# Patient Record
Sex: MALE | Race: ASIAN | NOT HISPANIC OR LATINO | URBAN - METROPOLITAN AREA
[De-identification: names, ages, dates, MRNs, and addresses within clinical notes are randomized per-mention and may not be internally consistent; named-entity substitution may affect disease eponyms.]

---

## 2018-08-24 ENCOUNTER — OFFICE VISIT - HEALTHEAST (OUTPATIENT)
Dept: INTERNAL MEDICINE | Facility: CLINIC | Age: 28
End: 2018-08-24

## 2018-08-24 DIAGNOSIS — F41.1 GENERALIZED ANXIETY DISORDER: ICD-10-CM

## 2018-08-24 ASSESSMENT — MIFFLIN-ST. JEOR: SCORE: 1519.21

## 2019-05-13 ENCOUNTER — OFFICE VISIT - HEALTHEAST (OUTPATIENT)
Dept: INTERNAL MEDICINE | Facility: CLINIC | Age: 29
End: 2019-05-13

## 2019-05-13 DIAGNOSIS — F41.1 GENERALIZED ANXIETY DISORDER: ICD-10-CM

## 2019-05-13 DIAGNOSIS — G47.9 SLEEP DISORDER: ICD-10-CM

## 2019-05-13 ASSESSMENT — MIFFLIN-ST. JEOR: SCORE: 1553.23

## 2020-02-27 ENCOUNTER — OFFICE VISIT - HEALTHEAST (OUTPATIENT)
Dept: INTERNAL MEDICINE | Facility: CLINIC | Age: 30
End: 2020-02-27

## 2020-02-27 DIAGNOSIS — F41.1 GENERALIZED ANXIETY DISORDER: ICD-10-CM

## 2020-02-27 RX ORDER — GABAPENTIN 100 MG/1
100 CAPSULE ORAL 2 TIMES DAILY PRN
Qty: 180 CAPSULE | Refills: 3 | Status: SHIPPED | OUTPATIENT
Start: 2020-02-27 | End: 2023-09-18

## 2020-11-01 ENCOUNTER — COMMUNICATION - HEALTHEAST (OUTPATIENT)
Dept: INTERNAL MEDICINE | Facility: CLINIC | Age: 30
End: 2020-11-01

## 2020-11-01 DIAGNOSIS — F41.1 GENERALIZED ANXIETY DISORDER: ICD-10-CM

## 2021-04-19 ENCOUNTER — COMMUNICATION - HEALTHEAST (OUTPATIENT)
Dept: INTERNAL MEDICINE | Facility: CLINIC | Age: 31
End: 2021-04-19

## 2021-04-19 DIAGNOSIS — F41.1 GENERALIZED ANXIETY DISORDER: ICD-10-CM

## 2021-04-19 RX ORDER — ESCITALOPRAM OXALATE 20 MG/1
TABLET ORAL
Qty: 90 TABLET | Refills: 0 | Status: SHIPPED | OUTPATIENT
Start: 2021-04-19 | End: 2021-10-13

## 2021-05-28 NOTE — PROGRESS NOTES
ASSESSMENT AND PLAN:    1. Generalized anxiety disorder  Improved.  Possible side effects. He will reduce dose and follow up prn and in 4 months    - escitalopram oxalate (LEXAPRO) 20 MG tablet; Take 1 tablet (20 mg total) by mouth daily.  Dispense: 30 tablet; Refill: 6    2. Sleepiness  Suspect related to quiet, boring work, yet can't exclude effect of the medication. ANDREA is also possibility, yet not strongly suggested.  I offered sleep study, he prefers to try lower dose and follow.  We did discuss avoiding high carbohydrate breakfast.     Patient Instructions   1. We discussed sleepiness and possible sleep disorder.  Will consider evaluation if symptoms persist or progress.      2. Decrease escitalopram to 10 mg po daily and see how symptoms  Go.    3. Follow up in 4 months    CHIEF COMPLAINT:  Chief Complaint   Patient presents with     Medication Management     HISTORY OF PRESENT ILLNESS:  Fercho Toure is a 29 y.o. male here for follow up and evaluation of sleepiness.  He will sometimes 'nod off' at work either before or after lunch, usually.  He is employed now, and works in quiet environment, at a banking entity.  He sleeps pretty well, and uses caffeine.  No excess alcohol.  Anxiety and panic are improved, but he wonders if the escitalopram is causing the sleepiness.  He does exercise and tolerates this well.  No fever, or cough, or sore throat, or diarrhea. No headache.     REVIEW OF SYSTEMS:   See HPI, all other systems on review are negative.    Past Medical History:   Diagnosis Date     Anxiety and depression      Social History     Tobacco Use   Smoking Status Never Smoker   Smokeless Tobacco Never Used     Family History   Problem Relation Age of Onset     Anxiety disorder Mother      Past Surgical History:   Procedure Laterality Date     NO PAST SURGERIES       VITALS:  Vitals:    05/13/19 1136   BP: 102/80   Patient Site: Left Arm   Patient Position: Sitting   Cuff Size: Adult Regular   Pulse:  "68   SpO2: 98%   Weight: 152 lb 12.8 oz (69.3 kg)   Height: 5' 3\" (1.6 m)     Wt Readings from Last 3 Encounters:   05/13/19 152 lb 12.8 oz (69.3 kg)   08/24/18 145 lb 4.8 oz (65.9 kg)     PHYSICAL EXAM:  Constitutional:  In NAD, alert and oriented  Cardiac:  Regular rhythm  Neurologic:  Speech clear, motor intact, gait normal     Psychiatric:  Mood appropriate, memory good, thinking is clear.     Current Outpatient Medications   Medication Sig Dispense Refill     escitalopram oxalate (LEXAPRO) 20 MG tablet Take 1 tablet (20 mg total) by mouth daily. 30 tablet 6     gabapentin (NEURONTIN) 100 MG capsule Take 100 mg by mouth 2 (two) times a day as needed. 60 capsule 3     No current facility-administered medications for this visit.      Harlan Ibrahim MD  Internal Medicine  [facility]  "

## 2021-06-01 VITALS — HEIGHT: 63 IN | BODY MASS INDEX: 25.75 KG/M2 | WEIGHT: 145.3 LBS

## 2021-06-03 VITALS — BODY MASS INDEX: 27.07 KG/M2 | WEIGHT: 152.8 LBS | HEIGHT: 63 IN

## 2021-06-04 VITALS
HEART RATE: 72 BPM | BODY MASS INDEX: 25.95 KG/M2 | SYSTOLIC BLOOD PRESSURE: 118 MMHG | DIASTOLIC BLOOD PRESSURE: 70 MMHG | WEIGHT: 146.5 LBS

## 2021-06-06 NOTE — PROGRESS NOTES
ASSESSMENT AND PLAN:    1. Generalized anxiety disorder  Ongoing symptoms when he stops the medications.  Wants to restart.  History of SHERIE with OCD features and some social phobia.  Clinically no depression at this time, or self harm ideation  Actually doing pretty well with work, although significant stress. We discussed the benefits of exercise.    - escitalopram oxalate (LEXAPRO) 20 MG tablet; Take 1 tablet (20 mg total) by mouth daily.  Dispense: 90 tablet; Refill: 3  - gabapentin (NEURONTIN) 100 MG capsule; Take 100 mg by mouth 2 (two) times a day as needed.  Dispense: 180 capsule; Refill: 3    Patient Instructions   1. restart escitalopram 20 mg by mouth daily.     2. Can have referral to psychology or psychiatry if you feel it is needed.     CHIEF COMPLAINT:  Chief Complaint   Patient presents with     Medication Management     Follow-up     HISTORY OF PRESENT ILLNESS:  Fercho Toure is a 29 y.o. male here in follow up.  Has been on escitalopram for SHERIE and he recently stopped the medication.  His anxiety 'came back' and so he would like to resume.  Long history of SHERIE in late youth, with OCD and social phobia.  At one time he had depression without significant thoughts of self harm. Gainfully employed,but it is stressful.  Otherwise doing well, does not exercise much.     REVIEW OF SYSTEMS:   See HPI, all other systems on review are negative.    Past Medical History:   Diagnosis Date     Anxiety and depression      Social History     Tobacco Use   Smoking Status Never Smoker   Smokeless Tobacco Never Used     Family History   Problem Relation Age of Onset     Anxiety disorder Mother      Suicidality Paternal Grandfather         possible     Past Surgical History:   Procedure Laterality Date     NO PAST SURGERIES       VITALS:  Vitals:    02/27/20 1254   BP: 118/70   Patient Site: Left Arm   Patient Position: Sitting   Cuff Size: Adult Regular   Pulse: 72   Weight: 146 lb 8 oz (66.5 kg)     Wt Readings  from Last 3 Encounters:   02/27/20 146 lb 8 oz (66.5 kg)   05/13/19 152 lb 12.8 oz (69.3 kg)   08/24/18 145 lb 4.8 oz (65.9 kg)     PHYSICAL EXAM:  Constitutional:  In NAD, alert and oriented  Neurologic:  Speech clear, no arm or leg weakness, gait normal     Psychiatric:  Mood and behavior appropriate, thinking is clear.     Current Outpatient Medications   Medication Sig Dispense Refill     escitalopram oxalate (LEXAPRO) 20 MG tablet Take 1 tablet (20 mg total) by mouth daily. 90 tablet 3     gabapentin (NEURONTIN) 100 MG capsule Take 100 mg by mouth 2 (two) times a day as needed. 180 capsule 3     Harlan Ibrahim MD  Internal Medicine  [facility]

## 2021-06-06 NOTE — PATIENT INSTRUCTIONS - HE
1. restart escitalopram 20 mg by mouth daily.     2. Can have referral to psychology or psychiatry if you feel it is needed.

## 2021-06-12 NOTE — TELEPHONE ENCOUNTER
Refill Approved    Rx renewed per Medication Renewal Policy. Medication was last renewed on 2/27/20.    Juana Salguero, [facility] Connection Triage/Med Refill 11/4/2020     Requested Prescriptions   Pending Prescriptions Disp Refills     escitalopram oxalate (LEXAPRO) 20 MG tablet [Pharmacy Med Name: ESCITALOPRAM 20 MG TABLET] 90 tablet 2     Sig: TAKE 1 TABLET BY MOUTH EVERY DAY       SSRI Refill Protocol  Passed - 11/1/2020 10:14 AM        Passed - PCP or prescribing provider visit in last year     Last office visit with prescriber/PCP: 2/27/2020 Harlan Ibrahim MD OR same dept: 2/27/2020 Harlan Ibrahim MD OR same specialty: 2/27/2020 Harlan Ibrahim MD  Last physical: Visit date not found Last MTM visit: Visit date not found   Next visit within 3 mo: Visit date not found  Next physical within 3 mo: Visit date not found  Prescriber OR PCP: Harlan Ibrahim MD  Last diagnosis associated with med order: 1. Generalized anxiety disorder  - escitalopram oxalate (LEXAPRO) 20 MG tablet [Pharmacy Med Name: ESCITALOPRAM 20 MG TABLET]; TAKE 1 TABLET BY MOUTH EVERY DAY  Dispense: 90 tablet; Refill: 2    If protocol passes may refill for 12 months if within 3 months of last provider visit (or a total of 15 months).

## 2021-06-16 PROBLEM — F41.1 GENERALIZED ANXIETY DISORDER: Status: ACTIVE | Noted: 2018-08-24

## 2021-06-16 NOTE — TELEPHONE ENCOUNTER
RN cannot approve Refill Request    RN can NOT refill this medication Protocol failed and NO refill given. Last office visit: 2/27/2020 Harlan Ibrahim MD Last Physical: Visit date not found Last MTM visit: Visit date not found Last visit same specialty: 2/27/2020 Harlan Ibrahim MD.  Next visit within 3 mo: Visit date not found  Next physical within 3 mo: Visit date not found      Agustin Muñoz, [facility] Connection Triage/Med Refill 4/19/2021    Requested Prescriptions   Pending Prescriptions Disp Refills     escitalopram oxalate (LEXAPRO) 20 MG tablet [Pharmacy Med Name: ESCITALOPRAM 20 MG TABLET] 90 tablet 0     Sig: TAKE 1 TABLET BY MOUTH EVERY DAY       SSRI Refill Protocol  Failed - 4/19/2021  8:47 AM        Failed - PCP or prescribing provider visit in last year     Last office visit with prescriber/PCP: 2/27/2020 Harlan Ibrahim MD OR same dept: Visit date not found OR same specialty: 2/27/2020 Harlan Ibrahim MD  Last physical: Visit date not found Last MTM visit: Visit date not found   Next visit within 3 mo: Visit date not found  Next physical within 3 mo: Visit date not found  Prescriber OR PCP: Harlan Ibrahim MD  Last diagnosis associated with med order: 1. Generalized anxiety disorder  - escitalopram oxalate (LEXAPRO) 20 MG tablet [Pharmacy Med Name: ESCITALOPRAM 20 MG TABLET]; TAKE 1 TABLET BY MOUTH EVERY DAY  Dispense: 90 tablet; Refill: 0    If protocol passes may refill for 12 months if within 3 months of last provider visit (or a total of 15 months).

## 2021-06-20 NOTE — PROGRESS NOTES
ASSESSMENT/PLAN:    1. Generalized anxiety disorder  Life long, with panic, moderate but overall functional and doing well.  Has financial constraints.  Will refill escitalopram 20 mg po daily.  Continue gabapentin 100mg po two times a day prn panic episodes - this has worked well.  Follow up in a few months and prn.     CHIEF COMPLAINT:  Chief Complaint   Patient presents with     Medication Management     med check      HISTORY OF PRESENT ILLNESS:  Fercho is a 28 y.o. male presenting to the clinic today with long standing anxiety disorder.  Does at times get panic.  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  Running out of is escitalopram and was 'rationing' it.  Symptoms have increased.  No self harm, or significant depression symptoms.  Exercises skating and tolerates this well.      REVIEW OF SYSTEMS:   Constitutional: no fever, chills, or sweats  Respiratory: No wheezes, cough, shortness of breath  Cardiovascular: No chest pain or palpitations  Gastrointestinal: No nausea, vomiting, diarrhea, dyspepsia, or pain  Psychiatric: see HPI   All other systems on reveiw are negative.    PFSH:    History   Smoking Status     Never Smoker   Smokeless Tobacco     Never Used     Family History   Problem Relation Age of Onset     Anxiety disorder Mother      Social History     Social History     Marital status: Single     Spouse name: N/A     Number of children: 0     Years of education: N/A     Occupational History     Temp worker      Social History Main Topics     Smoking status: Never Smoker     Smokeless tobacco: Never Used     Alcohol use Yes     Drug use: No     Sexual activity: Not on file     Other Topics Concern     Not on file     Social History Narrative    Roller blading, speed skating     Past Surgical History:   Procedure Laterality Date     NO PAST SURGERIES       No Known Allergies    Active Ambulatory Problems     Diagnosis Date Noted     Generalized anxiety disorder 08/24/2018     Resolved  "Ambulatory Problems     Diagnosis Date Noted     Anxiety and depression 08/24/2018     Past Medical History:   Diagnosis Date     Anxiety and depression      VITALS:  Vitals:    08/24/18 1634   BP: 120/72   Patient Site: Left Arm   Patient Position: Sitting   Cuff Size: Adult Regular   Pulse: 60   SpO2: 97%   Weight: 145 lb 4.8 oz (65.9 kg)   Height: 5' 3\" (1.6 m)     Wt Readings from Last 3 Encounters:   08/24/18 145 lb 4.8 oz (65.9 kg)     Body mass index is 25.74 kg/(m^2).    PHYSICAL EXAM:  General Appearance: In no acute distress  /72 (Patient Site: Left Arm, Patient Position: Sitting, Cuff Size: Adult Regular)  Pulse 60  Ht 5' 3\" (1.6 m)  Wt 145 lb 4.8 oz (65.9 kg)  SpO2 97%  BMI 25.74 kg/m2  RESPIRATORY: Clear to auscultation  CARDIOVASCULAR: S1, S2, without murmur   PSYCHIATRIC: Oriented X 3, mildly anxious, without confusion, behavior and affect normal    Current Outpatient Prescriptions   Medication Sig Dispense Refill     escitalopram oxalate (LEXAPRO) 20 MG tablet Take 1 tablet (20 mg total) by mouth daily. 30 tablet 6     gabapentin (NEURONTIN) 100 MG capsule Take 100 mg by mouth 2 (two) times a day as needed. 60 capsule 3     No current facility-administered medications for this visit.      "

## 2021-07-07 ENCOUNTER — OFFICE VISIT (OUTPATIENT)
Dept: URGENT CARE | Facility: URGENT CARE | Age: 31
End: 2021-07-07
Payer: COMMERCIAL

## 2021-07-07 ENCOUNTER — ANCILLARY PROCEDURE (OUTPATIENT)
Dept: GENERAL RADIOLOGY | Facility: CLINIC | Age: 31
End: 2021-07-07
Attending: NURSE PRACTITIONER
Payer: COMMERCIAL

## 2021-07-07 VITALS
DIASTOLIC BLOOD PRESSURE: 74 MMHG | TEMPERATURE: 98.6 F | OXYGEN SATURATION: 99 % | SYSTOLIC BLOOD PRESSURE: 112 MMHG | HEIGHT: 63 IN | WEIGHT: 150 LBS | BODY MASS INDEX: 26.58 KG/M2 | HEART RATE: 90 BPM | RESPIRATION RATE: 14 BRPM

## 2021-07-07 DIAGNOSIS — M79.672 LEFT FOOT PAIN: Primary | ICD-10-CM

## 2021-07-07 DIAGNOSIS — M79.672 LEFT FOOT PAIN: ICD-10-CM

## 2021-07-07 PROCEDURE — 99204 OFFICE O/P NEW MOD 45 MIN: CPT | Performed by: NURSE PRACTITIONER

## 2021-07-07 PROCEDURE — 36415 COLL VENOUS BLD VENIPUNCTURE: CPT | Performed by: NURSE PRACTITIONER

## 2021-07-07 PROCEDURE — 84550 ASSAY OF BLOOD/URIC ACID: CPT | Performed by: NURSE PRACTITIONER

## 2021-07-07 PROCEDURE — 73630 X-RAY EXAM OF FOOT: CPT | Mod: LT | Performed by: RADIOLOGY

## 2021-07-07 RX ORDER — NAPROXEN 500 MG/1
500 TABLET ORAL 2 TIMES DAILY WITH MEALS
Qty: 14 TABLET | Refills: 0 | Status: SHIPPED | OUTPATIENT
Start: 2021-07-07 | End: 2021-07-07

## 2021-07-07 RX ORDER — NAPROXEN 500 MG/1
500 TABLET ORAL 2 TIMES DAILY WITH MEALS
Qty: 14 TABLET | Refills: 0 | Status: SHIPPED | OUTPATIENT
Start: 2021-07-07 | End: 2021-07-14

## 2021-07-07 ASSESSMENT — ENCOUNTER SYMPTOMS
ADENOPATHY: 0
APPETITE CHANGE: 0
SHORTNESS OF BREATH: 0
FATIGUE: 0
LIGHT-HEADEDNESS: 0
WOUND: 0
JOINT SWELLING: 1
WEAKNESS: 0
ARTHRALGIAS: 1
FEVER: 0
NAUSEA: 0
DIAPHORESIS: 0
CHILLS: 0
CHEST TIGHTNESS: 0
VOMITING: 0
WHEEZING: 0
COLOR CHANGE: 1
PARESTHESIAS: 0
ABDOMINAL PAIN: 0
MYALGIAS: 1
PALPITATIONS: 0
SLEEP DISTURBANCE: 0
ACTIVITY CHANGE: 0
DIZZINESS: 0

## 2021-07-07 ASSESSMENT — MIFFLIN-ST. JEOR: SCORE: 1540.53

## 2021-07-07 NOTE — PROGRESS NOTES
"Chief Complaint   Patient presents with     Urgent Care     Musculoskeletal Problem     left foot pain started last Thursday, unknown injury but walks on treadmill.      SUBJECTIVE:  Fercho Toure is a 31 year old male who presents to the clinic today with severe medial foot pain today, cannot bear weight, limited ROM, started with 1st metatarsal pain, redness, swelling 6 days ago, no obvious injury. He does a lot of uphill incline walking. No pop, crunch, bulge. Does eat meat and seafood, but has never had gout before.    Past Medical History:   Diagnosis Date     Anxiety and depression      escitalopram oxalate (LEXAPRO) 20 MG tablet, [ESCITALOPRAM OXALATE (LEXAPRO) 20 MG TABLET] TAKE 1 TABLET BY MOUTH EVERY DAY  gabapentin (NEURONTIN) 100 MG capsule, [GABAPENTIN (NEURONTIN) 100 MG CAPSULE] Take 100 mg by mouth 2 (two) times a day as needed.    No current facility-administered medications on file prior to visit.     Social History     Tobacco Use     Smoking status: Never Smoker     Smokeless tobacco: Never Used   Substance Use Topics     Alcohol use: Yes     No Known Allergies    Review of Systems   Constitutional: Negative for activity change, appetite change, chills, diaphoresis, fatigue and fever.   Respiratory: Negative for chest tightness, shortness of breath and wheezing.    Cardiovascular: Negative for palpitations.   Gastrointestinal: Negative for abdominal pain, nausea and vomiting.   Musculoskeletal: Positive for arthralgias, gait problem, joint swelling and myalgias.   Skin: Positive for color change. Negative for rash and wound.   Neurological: Negative for dizziness, weakness, light-headedness and paresthesias.   Hematological: Negative for adenopathy.   Psychiatric/Behavioral: Negative for sleep disturbance.     EXAM:   /74   Pulse 90   Temp 98.6  F (37  C) (Oral)   Resp 14   Ht 1.6 m (5' 3\")   Wt 68 kg (150 lb)   SpO2 99%   BMI 26.57 kg/m      Physical Exam  Vitals signs reviewed. "   Constitutional:       Appearance: Normal appearance.   HENT:      Head: Normocephalic and atraumatic.      Nose: Nose normal.      Mouth/Throat:      Mouth: Mucous membranes are moist.      Pharynx: Oropharynx is clear.   Eyes:      Extraocular Movements: Extraocular movements intact.      Conjunctiva/sclera: Conjunctivae normal.      Pupils: Pupils are equal, round, and reactive to light.   Neck:      Musculoskeletal: Normal range of motion and neck supple.   Cardiovascular:      Rate and Rhythm: Normal rate.   Pulmonary:      Effort: Pulmonary effort is normal.   Musculoskeletal:         General: Swelling, tenderness and signs of injury present.      Comments: Left medial foot with podagra appearance of erythema, edema, warmth, tenderness. Also has new swelling and tenderness over talus.   Skin:     General: Skin is warm and dry.      Findings: Erythema present. No rash.   Neurological:      General: No focal deficit present.      Mental Status: He is alert and oriented to person, place, and time.      Gait: Gait abnormal (antalgic).   Psychiatric:         Mood and Affect: Mood normal.         Behavior: Behavior normal.       Xray done in clinic read by me as negative for fracture.  Results for orders placed or performed in visit on 07/07/21   XR Foot Left G/E 3 Views     Status: None    Narrative    LEFT FOOT THREE OR MORE VIEWS  7/7/2021 4:15 PM     HISTORY: Severe medial foot pain today, cannot bear weight, limited  range of motion. Started with first metatarsal pain 6 days ago, no  injury. Left foot pain.    COMPARISON: None.      Impression    IMPRESSION: There is an accessory navicular bone that is positioned  more proximally than typically seen, approximately 0.5 cm proximal to  the navicular bone. This could be a variant position of the accessory  navicular bone. Cannot exclude trauma or tearing of the tibialis  posterior tendon with mild proximal retraction. There is medial soft  tissue swelling.  Osseous structures are otherwise unremarkable. Normal  joint spacing. Recommend consideration of MRI of the ankle to evaluate  for tibialis posterior tendon injury or other medial ankle injury.    LETICIA DAVIS MD         SYSTEM ID:  [id number]     ASSESSMENT:    ICD-10-CM    1. Left foot pain  M79.672 XR Foot Left G/E 3 Views     Uric acid     Ankle/Foot Bracing Supplies Order for DME - ONLY FOR DME     Orthopedic  Referral     naproxen (NAPROSYN) 500 MG tablet     DISCONTINUED: naproxen (NAPROSYN) 500 MG tablet     PLAN:  Patient Instructions   Xray with swelling and possible abnormality that warrants MRI to rule out posterior tibialis tendon tear  Ortho referral placed  Boot in clinic  Rest, ice, compression, elevate  Rotate tylenol and ibuprofen  Naproxen empirically for gout and inflammation  Uric acid pending    Follow up with primary care provider with any problems, questions or concerns or if symptoms worsen or fail to improve. Patient agreed to plan and verbalized understanding.    Kassidy Fletcher, ESTEPHANIE-BC  [facility]

## 2021-07-07 NOTE — PATIENT INSTRUCTIONS
Xray with swelling and possible abnormality that warrants MRI to rule out posterior tibialis tendon tear  Ortho referral placed  Boot in clinic  Rest, ice, compression, elevate  Rotate tylenol and ibuprofen  Naproxen empirically for gout and inflammation  Uric acid pending

## 2021-07-08 LAB — URATE SERPL-MCNC: 9.7 MG/DL (ref 3.5–7.2)

## 2021-07-14 PROBLEM — F41.9 ANXIETY AND DEPRESSION: Status: RESOLVED | Noted: 2018-08-24 | Resolved: 2018-08-24

## 2021-07-14 PROBLEM — F32.A ANXIETY AND DEPRESSION: Status: RESOLVED | Noted: 2018-08-24 | Resolved: 2018-08-24

## 2021-08-21 ENCOUNTER — HEALTH MAINTENANCE LETTER (OUTPATIENT)
Age: 31
End: 2021-08-21

## 2021-10-13 ENCOUNTER — OFFICE VISIT (OUTPATIENT)
Dept: FAMILY MEDICINE | Facility: CLINIC | Age: 31
End: 2021-10-13
Payer: COMMERCIAL

## 2021-10-13 VITALS
SYSTOLIC BLOOD PRESSURE: 122 MMHG | HEART RATE: 60 BPM | BODY MASS INDEX: 26.93 KG/M2 | OXYGEN SATURATION: 98 % | WEIGHT: 152 LBS | DIASTOLIC BLOOD PRESSURE: 60 MMHG

## 2021-10-13 DIAGNOSIS — E16.1 REACTIVE HYPOGLYCEMIA: ICD-10-CM

## 2021-10-13 DIAGNOSIS — F41.1 GENERALIZED ANXIETY DISORDER: Primary | ICD-10-CM

## 2021-10-13 DIAGNOSIS — F41.1 GENERALIZED ANXIETY DISORDER: ICD-10-CM

## 2021-10-13 PROCEDURE — 90686 IIV4 VACC NO PRSV 0.5 ML IM: CPT | Performed by: NURSE PRACTITIONER

## 2021-10-13 PROCEDURE — 99214 OFFICE O/P EST MOD 30 MIN: CPT | Mod: 25 | Performed by: NURSE PRACTITIONER

## 2021-10-13 PROCEDURE — 90471 IMMUNIZATION ADMIN: CPT | Performed by: NURSE PRACTITIONER

## 2021-10-13 RX ORDER — ESCITALOPRAM OXALATE 10 MG/1
15 TABLET ORAL DAILY
Qty: 135 TABLET | Refills: 0 | Status: SHIPPED | OUTPATIENT
Start: 2021-10-13 | End: 2021-10-13

## 2021-10-13 RX ORDER — ESCITALOPRAM OXALATE 20 MG/1
TABLET ORAL
Qty: 90 TABLET | Refills: 0 | Status: CANCELLED | OUTPATIENT
Start: 2021-10-13

## 2021-10-13 ASSESSMENT — PATIENT HEALTH QUESTIONNAIRE - PHQ9
5. POOR APPETITE OR OVEREATING: SEVERAL DAYS
SUM OF ALL RESPONSES TO PHQ QUESTIONS 1-9: 7

## 2021-10-13 ASSESSMENT — ANXIETY QUESTIONNAIRES
5. BEING SO RESTLESS THAT IT IS HARD TO SIT STILL: SEVERAL DAYS
6. BECOMING EASILY ANNOYED OR IRRITABLE: SEVERAL DAYS
1. FEELING NERVOUS, ANXIOUS, OR ON EDGE: SEVERAL DAYS
3. WORRYING TOO MUCH ABOUT DIFFERENT THINGS: SEVERAL DAYS
2. NOT BEING ABLE TO STOP OR CONTROL WORRYING: SEVERAL DAYS
IF YOU CHECKED OFF ANY PROBLEMS ON THIS QUESTIONNAIRE, HOW DIFFICULT HAVE THESE PROBLEMS MADE IT FOR YOU TO DO YOUR WORK, TAKE CARE OF THINGS AT HOME, OR GET ALONG WITH OTHER PEOPLE: SOMEWHAT DIFFICULT
7. FEELING AFRAID AS IF SOMETHING AWFUL MIGHT HAPPEN: SEVERAL DAYS
GAD7 TOTAL SCORE: 7

## 2021-10-13 NOTE — PROGRESS NOTES
Assessment & Plan   1. Generalized anxiety disorder  SHERIE previously well controlled with Lexapro 20mg though states this dose made him feel slightly 'numb'.  Currently uncontrolled on 10mg. We discussed option of switching medication versus trial of intermediate dose of 15mg.  He would prefer to stay with the Lexapro and try 15mg.  Follow up 4-6 weeks for recheck.  May continue to use gabapentin as needed, he does not need a refill for this today.    - escitalopram (LEXAPRO) 10 MG tablet; Take 1.5 tablets (15 mg) by mouth daily  Dispense: 135 tablet; Refill: 0    2. Reactive hypoglycemia  Symptoms consistent with reactive hypoglycemia.  No H diabetes.  His diet is very high in carbohydrates and processed foods. Discussed increasing protein and healthy fats in diet to avoid spikes and troughs with blood glucose.  He will work on this.  If persistent symptoms consider home monitoring with gluocometer    Brianna Yu, CNP    Subjective     HPI:   Fercho Toure is a 31 year old male who presents for medication check     He is a patient of Dr. Ibrahim.  History of generalized anxiety disorder. Has been treated with Lexapro 20mg and gabapentin prn.      He states for the past year he has been cutting his Lexapro dose in half to 10mg in order to conserve the medication.  At first he felt this was manageable but over the past few months he has experienced increased anxiety.  Work is stressful.  Frequent worry and ruminating thoughts. No panic attacks.  Uses gabapentin rarely for more intense anxiety symptoms.  He does note that when he was on the 20mg dose of Lexapro he felt a bit 'numb'.  In terms of other medications tried, fluoxetine many years ago (recalls loss of appetite).     Hypoglycemia symptoms:  He is also concerned about hypoglycemia symptoms he states he experiences a few hours after lunch.  Shaky, sweating. Happens a few times a week. Improves with eating.   Breakfast is coffee. Lunch is frozen  meal.  Dinner is bigger.  No H diabetes.   He acknowledges diet is high in carbs      Allergies:  has No Known Allergies.    SH/FH:  Social History and Family History reviewed and updated.   Tobacco Status:  He  reports that he has never smoked. He has never used smokeless tobacco.    Review of Systems:  A complete head to toe ROS is negative unless otherwise noted in HPI    Objective     Vitals:    10/13/21 1253   BP: 122/60   Pulse: 60   SpO2: 98%   Weight: 68.9 kg (152 lb)       Physical Exam:  GENERAL: Alert, well-appearing   PSYCH: Pleasant mood, affect appropriate.  Good judgment and insight.  Intact recent and remote memory.  Good eye contact.  CV: Regular rate and rhythm without murmurs, rubs or gallops.  RESP: Lung sounds clear

## 2021-10-14 RX ORDER — ESCITALOPRAM OXALATE 10 MG/1
10 TABLET ORAL DAILY
Qty: 90 TABLET | Refills: 0 | Status: SHIPPED | OUTPATIENT
Start: 2021-10-14 | End: 2021-11-11

## 2021-10-14 RX ORDER — ESCITALOPRAM OXALATE 5 MG/1
5 TABLET ORAL DAILY
Qty: 90 TABLET | Refills: 0 | Status: SHIPPED | OUTPATIENT
Start: 2021-10-14 | End: 2021-11-11

## 2021-10-14 ASSESSMENT — ANXIETY QUESTIONNAIRES: GAD7 TOTAL SCORE: 7

## 2021-11-11 ENCOUNTER — VIRTUAL VISIT (OUTPATIENT)
Dept: FAMILY MEDICINE | Facility: CLINIC | Age: 31
End: 2021-11-11
Payer: COMMERCIAL

## 2021-11-11 DIAGNOSIS — F41.1 GENERALIZED ANXIETY DISORDER: ICD-10-CM

## 2021-11-11 PROCEDURE — 99213 OFFICE O/P EST LOW 20 MIN: CPT | Mod: 95 | Performed by: NURSE PRACTITIONER

## 2021-11-11 RX ORDER — ESCITALOPRAM OXALATE 10 MG/1
10 TABLET ORAL DAILY
Qty: 90 TABLET | Refills: 3 | Status: SHIPPED | OUTPATIENT
Start: 2021-11-11 | End: 2023-02-08

## 2021-11-11 RX ORDER — ESCITALOPRAM OXALATE 5 MG/1
5 TABLET ORAL DAILY
Qty: 90 TABLET | Refills: 3 | Status: SHIPPED | OUTPATIENT
Start: 2021-11-11 | End: 2023-02-08

## 2021-11-11 NOTE — PROGRESS NOTES
Fercho is a 31 year old who is being evaluated via a billable video visit.      How would you like to obtain your AVS? MyChart  If the video visit is dropped, the invitation should be resent by: Text to cell phone: [phone]  Will anyone else be joining your video visit? No      Video Start Time: 12:42 PM    1. Generalized anxiety disorder  Med check today after increasing Lexapro to 15mg.  He feels this has been helpful with anxiety.  Feels more manageable without feeling as numb as he did previously on 20mg.  He is experiencing some grogginess though feels this is tolerable and appreciates that he can now sleep better at night. Will leave dose stable at 15mg.  Encouraged recheck if symptoms worsen, otherwise follow up one year.   - escitalopram (LEXAPRO) 10 MG tablet; Take 1 tablet (10 mg) by mouth daily Along with 5 mg to total 15 mg daily  Dispense: 90 tablet; Refill: 3  - escitalopram (LEXAPRO) 5 MG tablet; Take 1 tablet (5 mg) by mouth daily Along with 10 mg to total 15 mg daily  Dispense: 90 tablet; Refill: 3    Subjective   Fercho is a 31 year old who presents for the following health issues     HPI     Following up today for SHERIE.  He was seen one month ago.  Had not tolerated Lexapro 20mg (felt numb) though symptoms uncontrolled at 10mg.  Settled on intermediate 15mg dose.      He states the only side effect that he has noted is that he is feeling drowsy and sleeping more. Is still able to function and get to work, etc.  Appreciates that he is better able to sleep at night.  He has also noted a bit more appetite suppression.     Anxiety level is lower.  Not as intense as previously.  Feels much more manageable.  Has not had to take gabapentin since increasing dose.       Review of Systems   Negative unless otherwise noted in HPI      Objective           Vitals:  No vitals were obtained today due to virtual visit.    Physical Exam   GENERAL: Healthy, alert and no distress  PSYCH: Mentation appears normal,  affect normal/bright, judgement and insight intact, normal speech and appearance well-groomed.                Video-Visit Details    Type of service:  Video Visit    Video End Time:12:54 PM    Originating Location (pt. Location): Home    Distant Location (provider location):  [facility]     Platform used for Video Visit: [employer]

## 2022-10-01 ENCOUNTER — HEALTH MAINTENANCE LETTER (OUTPATIENT)
Age: 32
End: 2022-10-01

## 2023-02-07 DIAGNOSIS — F41.1 GENERALIZED ANXIETY DISORDER: ICD-10-CM

## 2023-02-07 NOTE — TELEPHONE ENCOUNTER
"Routing refill request to provider for review/approval because:  Patient needs to be seen because it has been more than 1 year since last office visit.    Last Written Prescription Date:  11/11/21  Last Fill Quantity: 90,  # refills: 3   Last office visit provider:  11/11/21     Requested Prescriptions   Pending Prescriptions Disp Refills     escitalopram (LEXAPRO) 10 MG tablet [Pharmacy Med Name: ESCITALOPRAM 10 MG TABLET] 90 tablet 2     Sig: TAKE 1 TABLET (10 MG) BY MOUTH DAILY ALONG WITH 5 MG TO TOTAL 15 MG DAILY       SSRIs Protocol Failed - 2/7/2023 12:40 AM        Failed - Recent (12 mo) or future (30 days) visit within the authorizing provider's specialty     Patient has had an office visit with the authorizing provider or a provider within the authorizing providers department within the previous 12 mos or has a future within next 30 days. See \"Patient Info\" tab in inbasket, or \"Choose Columns\" in Meds & Orders section of the refill encounter.              Passed - Medication is active on med list        Passed - Patient is age 18 or older           escitalopram (LEXAPRO) 5 MG tablet [Pharmacy Med Name: ESCITALOPRAM 5 MG TABLET] 90 tablet 2     Sig: TAKE 1 TABLET (5 MG) BY MOUTH DAILY ALONG WITH 10 MG TO TOTAL 15 MG DAILY       SSRIs Protocol Failed - 2/7/2023 12:40 AM        Failed - Recent (12 mo) or future (30 days) visit within the authorizing provider's specialty     Patient has had an office visit with the authorizing provider or a provider within the authorizing providers department within the previous 12 mos or has a future within next 30 days. See \"Patient Info\" tab in inbasket, or \"Choose Columns\" in Meds & Orders section of the refill encounter.              Passed - Medication is active on med list        Passed - Patient is age 18 or older             Juana Salguero RN 02/07/23 3:47 PM  "

## 2023-02-08 RX ORDER — ESCITALOPRAM OXALATE 10 MG/1
10 TABLET ORAL DAILY
Qty: 90 TABLET | Refills: 2 | Status: SHIPPED | OUTPATIENT
Start: 2023-02-08 | End: 2023-07-24

## 2023-02-08 RX ORDER — ESCITALOPRAM OXALATE 5 MG/1
5 TABLET ORAL DAILY
Qty: 90 TABLET | Refills: 2 | Status: SHIPPED | OUTPATIENT
Start: 2023-02-08 | End: 2023-07-24 | Stop reason: DRUGHIGH

## 2023-07-24 ENCOUNTER — VIRTUAL VISIT (OUTPATIENT)
Dept: INTERNAL MEDICINE | Facility: CLINIC | Age: 33
End: 2023-07-24
Payer: COMMERCIAL

## 2023-07-24 DIAGNOSIS — F33.0 MILD EPISODE OF RECURRENT MAJOR DEPRESSIVE DISORDER (H): ICD-10-CM

## 2023-07-24 DIAGNOSIS — Z00.00 PREVENTATIVE HEALTH CARE: ICD-10-CM

## 2023-07-24 DIAGNOSIS — F41.1 GENERALIZED ANXIETY DISORDER: Primary | ICD-10-CM

## 2023-07-24 PROBLEM — F42.9 OCD (OBSESSIVE COMPULSIVE DISORDER): Status: ACTIVE | Noted: 2023-02-13

## 2023-07-24 PROBLEM — F32.0 CURRENT MILD EPISODE OF MAJOR DEPRESSIVE DISORDER (H): Status: ACTIVE | Noted: 2023-02-13

## 2023-07-24 PROCEDURE — 96127 BRIEF EMOTIONAL/BEHAV ASSMT: CPT | Mod: 95 | Performed by: INTERNAL MEDICINE

## 2023-07-24 PROCEDURE — 99214 OFFICE O/P EST MOD 30 MIN: CPT | Mod: 95 | Performed by: INTERNAL MEDICINE

## 2023-07-24 RX ORDER — BUPROPION HYDROCHLORIDE 75 MG/1
75 TABLET ORAL 2 TIMES DAILY
Qty: 60 TABLET | Refills: 11 | Status: SHIPPED | OUTPATIENT
Start: 2023-07-24 | End: 2024-07-15

## 2023-07-24 RX ORDER — ESCITALOPRAM OXALATE 20 MG/1
20 TABLET ORAL DAILY
Qty: 30 TABLET | Refills: 11 | Status: SHIPPED | OUTPATIENT
Start: 2023-07-24 | End: 2024-07-15

## 2023-07-24 ASSESSMENT — ANXIETY QUESTIONNAIRES
6. BECOMING EASILY ANNOYED OR IRRITABLE: MORE THAN HALF THE DAYS
GAD7 TOTAL SCORE: 14
7. FEELING AFRAID AS IF SOMETHING AWFUL MIGHT HAPPEN: MORE THAN HALF THE DAYS
5. BEING SO RESTLESS THAT IT IS HARD TO SIT STILL: MORE THAN HALF THE DAYS
4. TROUBLE RELAXING: MORE THAN HALF THE DAYS
2. NOT BEING ABLE TO STOP OR CONTROL WORRYING: MORE THAN HALF THE DAYS
3. WORRYING TOO MUCH ABOUT DIFFERENT THINGS: MORE THAN HALF THE DAYS
1. FEELING NERVOUS, ANXIOUS, OR ON EDGE: MORE THAN HALF THE DAYS
GAD7 TOTAL SCORE: 14
IF YOU CHECKED OFF ANY PROBLEMS ON THIS QUESTIONNAIRE, HOW DIFFICULT HAVE THESE PROBLEMS MADE IT FOR YOU TO DO YOUR WORK, TAKE CARE OF THINGS AT HOME, OR GET ALONG WITH OTHER PEOPLE: SOMEWHAT DIFFICULT

## 2023-07-24 ASSESSMENT — PATIENT HEALTH QUESTIONNAIRE - PHQ9
SUM OF ALL RESPONSES TO PHQ QUESTIONS 1-9: 13
SUM OF ALL RESPONSES TO PHQ QUESTIONS 1-9: 13
10. IF YOU CHECKED OFF ANY PROBLEMS, HOW DIFFICULT HAVE THESE PROBLEMS MADE IT FOR YOU TO DO YOUR WORK, TAKE CARE OF THINGS AT HOME, OR GET ALONG WITH OTHER PEOPLE: SOMEWHAT DIFFICULT

## 2023-07-24 NOTE — PATIENT INSTRUCTIONS
Increase Lexapro to 20 mg daily    Bupropion 75 mg twice daily    Consider lorazepam or hydroxyzine but continue gabapentin as needed    Consider Abilify    MyChart communication initiated

## 2023-07-24 NOTE — PROGRESS NOTES
Fercho is a 33 year old male contacting the clinic today via video, who will use the platform: [employer] for the visit.  Phone # for [employer], or if [facility] drops:   Telephone Information:   Mobile [phone]          ASSESSMENT and PLAN:  1. Generalized anxiety disorder  Push Lexapro to maximum.  Add bupropion.  Monitor for worsening anxiety.  Consider Abilify  - buPROPion (WELLBUTRIN) 75 MG tablet; Take 1 tablet (75 mg) by mouth 2 times daily  Dispense: 60 tablet; Refill: 11  - escitalopram (LEXAPRO) 20 MG tablet; Take 1 tablet (20 mg) by mouth daily  Dispense: 30 tablet; Refill: 11    2. Mild episode of recurrent major depressive disorder (H)  - buPROPion (WELLBUTRIN) 75 MG tablet; Take 1 tablet (75 mg) by mouth 2 times daily  Dispense: 60 tablet; Refill: 11    3. Preventative health care  - REVIEW OF HEALTH MAINTENANCE PROTOCOL ORDERS       Patient Instructions   Increase Lexapro to 20 mg daily    Bupropion 75 mg twice daily    Consider lorazepam or hydroxyzine but continue gabapentin as needed    Consider Abilify    MyChart communication initiated            Return if symptoms worsen or fail to improve, for using a video visit.       CHIEF COMPLAINT:  Chief Complaint   Patient presents with     Medication Follow-up     Pt is taking Lexapro and doesn't feel that it is working well. Would like to discuss dosage.     Recheck Medication           7/24/2023     3:58 PM   Additional Questions   Roomed by kendall berger   Accompanied by alone         7/24/2023     3:58 PM   Patient Reported Additional Medications   Patient reports taking the following new medications none       HISTORY OF PRESENT ILLNESS:  Fercho is a 33 year old male contacting the clinic today via video for complaints of worsening anxiety.  He has been intermittently on Lexapro for several years.  He has been on and off several times.  20 mg has sometimes made him feel numb.  He was off again and resumed in February.  He reports that it took 4 to 6 weeks for  "his symptoms to come under control.  No side effects at this time on 15 mg.  Has never taken any other medication.  Takes gabapentin as needed for anxiety, low-dose, but finds it somewhat fatiguing.  Feels apathetic and has gained weight    History of Present Illness       Mental Health Follow-up:  Patient presents to follow-up on Depression & Anxiety.Patient's depression since last visit has been:  Medium  The patient is not having other symptoms associated with depression.  Patient's anxiety since last visit has been:  Medium  The patient is not having other symptoms associated with anxiety.  Any significant life events: No  Patient is feeling anxious or having panic attacks.  Patient has no concerns about alcohol or drug use.    He eats 2-3 servings of fruits and vegetables daily.He consumes 1 sweetened beverage(s) daily.He exercises with enough effort to increase his heart rate 30 to 60 minutes per day.  He exercises with enough effort to increase his heart rate 3 or less days per week.   He is taking medications regularly.    Today's PHQ-9         PHQ-9 Total Score: 13    PHQ-9 Q9 Thoughts of better off dead/self-harm past 2 weeks :   Several days  Thoughts of suicide or self harm: (P) No  Self-harm Plan:     Self-harm Action:       Safety concerns for self or others: (P) No    How difficult have these problems made it for you to do your work, take care of things at home, or get along with other people: Somewhat difficult  Today's SHERIE-7 Score: 14      REVIEW OF SYSTEMS:         PFSH:  Social History     Social History Narrative    Roller blading, speed skating.  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         TOBACCO USE:  History   Smoking Status     Never   Smokeless Tobacco     Never       VITALS:  There were no vitals filed for this visit.  There were no vitals taken for this visit. Estimated body mass index is 26.93 kg/m  as calculated from the following:    Height as of 7/7/21: 1.6 m (5' 3\").    " Weight as of 10/13/21: 68.9 kg (152 lb).    PHYSICAL EXAM:  (observations via Video)  Alert.  Somewhat apathetic    MEDICATIONS:   Current Outpatient Medications   Medication Sig Dispense Refill     buPROPion (WELLBUTRIN) 75 MG tablet Take 1 tablet (75 mg) by mouth 2 times daily 60 tablet 11     escitalopram (LEXAPRO) 20 MG tablet Take 1 tablet (20 mg) by mouth daily 30 tablet 11     gabapentin (NEURONTIN) 100 MG capsule [GABAPENTIN (NEURONTIN) 100 MG CAPSULE] Take 100 mg by mouth 2 (two) times a day as needed. 180 capsule 3       Outside Notes summarized: Chart review indicating various doses of Lexapro.   reviewed  Labs, x-rays, cardiology, GI tests reviewed: Uric acid elevated  No results for input(s): HGB, WBC, NA, POTASSIUM, CR, A1C, PSA, URIC, B12, TSH, VITDT, SED, CRP in the last 59204 hours.  No results found for: RIRXQ79ZQB  No results found for: CHOL  New orders:   Orders Placed This Encounter   Procedures     REVIEW OF HEALTH MAINTENANCE PROTOCOL ORDERS       Independent review of:     Jung Crabtree MD  [facility]    Video-Visit Details  Type of service:  Video Visit  Patient has given verbal consent to a Video visit?  Yes  How would you like to obtain your AVS?  MyChart  Will anyone else be joining your video visit, giving supplemental history? No    Originating location (pt location): Home    Distant Location (provider location):  Off-site    Video Start Time: 5:01 PM  Video End time:  5:24 PM  Face to face plus orders: 23 minutes  Documentation time:  3 minutes     The visit lasted a total of 24 minutes

## 2023-07-24 NOTE — PROGRESS NOTES
Fercho is a 33 year old who is being evaluated via a billable video visit.      How would you like to obtain your AVS? MyChart  If the video visit is dropped, the invitation should be resent by: Text to cell phone: [phone]  Will anyone else be joining your video visit? No  {If patient encounters technical issues they should call [phone] :671083}        {PROVIDER CHARTING PREFERENCE:229079}    Subjective   Fercho is a 33 year old, presenting for the following health issues:  Medication Follow-up (Pt is taking Lexapro and doesn't feel that it is working well. Would like to discuss dosage.) and Recheck Medication      7/24/2023     3:58 PM   Additional Questions   Roomed by kendall berger   Accompanied by alone         7/24/2023     3:58 PM   Patient Reported Additional Medications   Patient reports taking the following new medications none     History of Present Illness       Mental Health Follow-up:  Patient presents to follow-up on Depression & Anxiety.Patient's depression since last visit has been:  Medium  The patient is not having other symptoms associated with depression.  Patient's anxiety since last visit has been:  Medium  The patient is not having other symptoms associated with anxiety.  Any significant life events: No  Patient is feeling anxious or having panic attacks.  Patient has no concerns about alcohol or drug use.    He eats 2-3 servings of fruits and vegetables daily.He consumes 1 sweetened beverage(s) daily.He exercises with enough effort to increase his heart rate 30 to 60 minutes per day.  He exercises with enough effort to increase his heart rate 3 or less days per week.   He is taking medications regularly.     {ALERT  Recent PHQ-9 score indicates suicidal ideations :893064}  {Provider Documentation  Link to [facility]-SSRS ([facility]) Flowsheet :840815}  {SUPERLIST (Optional):752974}  {additonal problems for provider to add (Optional):759111}      Review of Systems   {ROS COMP  (Optional):418160}      Objective           Vitals:  No vitals were obtained today due to virtual visit.    Physical Exam   {video visit exam brief selected:196147}    {Diagnostic Test Results (Optional):827091}    {AMBULATORY ATTESTATION (Optional):129109}        Video-Visit Details    Type of service:  Video Visit     Originating Location (pt. Location): Home  {PROVIDER LOCATION On-site should be selected for visits conducted from your clinic location or adjoining [facility] hospital, academic office, or other nearby [facility] building. Off-site should be selected for all other provider locations, including home:662838}  Distant Location (provider location):  Off-site  Platform used for Video Visit: [employer]

## 2023-09-18 ENCOUNTER — MYC REFILL (OUTPATIENT)
Dept: INTERNAL MEDICINE | Facility: CLINIC | Age: 33
End: 2023-09-18
Payer: COMMERCIAL

## 2023-09-18 DIAGNOSIS — F41.1 GENERALIZED ANXIETY DISORDER: ICD-10-CM

## 2023-09-18 RX ORDER — GABAPENTIN 100 MG/1
100 CAPSULE ORAL 2 TIMES DAILY PRN
Qty: 180 CAPSULE | Refills: 3 | Status: SHIPPED | OUTPATIENT
Start: 2023-09-18

## 2023-10-15 ENCOUNTER — HEALTH MAINTENANCE LETTER (OUTPATIENT)
Age: 33
End: 2023-10-15

## 2024-07-12 DIAGNOSIS — F33.0 MILD EPISODE OF RECURRENT MAJOR DEPRESSIVE DISORDER (H): ICD-10-CM

## 2024-07-12 DIAGNOSIS — F41.1 GENERALIZED ANXIETY DISORDER: ICD-10-CM

## 2024-07-15 RX ORDER — BUPROPION HYDROCHLORIDE 75 MG/1
75 TABLET ORAL 2 TIMES DAILY
Qty: 180 TABLET | Refills: 3 | Status: SHIPPED | OUTPATIENT
Start: 2024-07-15

## 2024-07-15 RX ORDER — ESCITALOPRAM OXALATE 20 MG/1
20 TABLET ORAL DAILY
Qty: 90 TABLET | Refills: 3 | Status: SHIPPED | OUTPATIENT
Start: 2024-07-15

## 2024-12-08 ENCOUNTER — HEALTH MAINTENANCE LETTER (OUTPATIENT)
Age: 34
End: 2024-12-08

## 2025-03-18 ENCOUNTER — VIRTUAL VISIT (OUTPATIENT)
Dept: FAMILY MEDICINE | Facility: CLINIC | Age: 35
End: 2025-03-18
Payer: COMMERCIAL

## 2025-03-18 DIAGNOSIS — M10.9 ACUTE GOUTY ARTHRITIS: Primary | ICD-10-CM

## 2025-03-18 PROCEDURE — 98005 SYNCH AUDIO-VIDEO EST LOW 20: CPT

## 2025-03-18 PROCEDURE — 1125F AMNT PAIN NOTED PAIN PRSNT: CPT | Mod: 95

## 2025-03-18 RX ORDER — ALLOPURINOL 100 MG/1
100 TABLET ORAL DAILY
Qty: 30 TABLET | Refills: 1 | Status: SHIPPED | OUTPATIENT
Start: 2025-03-18

## 2025-03-18 RX ORDER — COLCHICINE 0.6 MG/1
TABLET ORAL
Qty: 20 TABLET | Refills: 0 | Status: SHIPPED | OUTPATIENT
Start: 2025-03-18

## 2025-03-18 RX ORDER — INDOMETHACIN 50 MG/1
50 CAPSULE ORAL
COMMUNITY
Start: 2024-06-27

## 2025-03-18 ASSESSMENT — PATIENT HEALTH QUESTIONNAIRE - PHQ9
SUM OF ALL RESPONSES TO PHQ QUESTIONS 1-9: 2
10. IF YOU CHECKED OFF ANY PROBLEMS, HOW DIFFICULT HAVE THESE PROBLEMS MADE IT FOR YOU TO DO YOUR WORK, TAKE CARE OF THINGS AT HOME, OR GET ALONG WITH OTHER PEOPLE: NOT DIFFICULT AT ALL
SUM OF ALL RESPONSES TO PHQ QUESTIONS 1-9: 2

## 2025-03-18 NOTE — PROGRESS NOTES
Fercho is a 35 year old who is being evaluated via a billable video visit.    How would you like to obtain your AVS? MyChart  If the video visit is dropped, the invitation should be resent by: Text to cell phone: [phone]  Will anyone else be joining your video visit? No      Assessment & Plan     Acute gouty arthritis  History of gout with acute gout flare of left ankle today. Will treat with colchicine - indomethacin ineffective. Having more frequent flares. We discussed he should wait to start allopurinol until acute flare has resolved. Will return for lab visit one month after starting allopurinol to check uric acid level and can make dose adjustments at that time. Verbalizes understanding.   - colchicine (COLCRYS) 0.6 MG tablet  Dispense: 20 tablet; Refill: 0  - allopurinol (ZYLOPRIM) 100 MG tablet  Dispense: 30 tablet; Refill: 1  - **Uric acid FUTURE 2mo    Plan to follow up in 1-2 months.     The longitudinal plan of care for the diagnosis(es)/condition(s) as documented were addressed during this visit. Due to the added complexity in care, I will continue to support Fercho in the subsequent management and with ongoing continuity of care.        Subjective   Fercho is a 35 year old, presenting for the following health issues:  Arthritis (Gout in the past, has taken NSAIDs, looking to discuss medication. Left ankle currently, started on Friday, having trouble walking.)      3/18/2025     9:11 AM   Additional Questions   Roomed by VAHE Dalton BSN     History of Present Illness       Reason for visit:  Gout    He eats 2-3 servings of fruits and vegetables daily.He consumes 2 sweetened beverage(s) daily.He exercises with enough effort to increase his heart rate 20 to 29 minutes per day.  He exercises with enough effort to increase his heart rate 3 or less days per week.   He is taking medications regularly.      Gout flare - started 4 days ago. He can barely walk and has been using crutches. Mostly his left ankle.    First diagnosed in 2021. Did not have another flare for several years. Now having more frequent flares and wondering about long term management.   Has also had flare in both of his great toes.   Was prescribed indomethacin however this has not worked well and also upsets his stomach.           Review of Systems  Constitutional, HEENT, cardiovascular, pulmonary, gi and gu systems are negative, except as otherwise noted.      Objective    Vitals - Patient Reported  Pain Score: Severe Pain (7)  Pain Loc: Ankle        Physical Exam   GENERAL: alert and no distress  EYES: Eyes grossly normal to inspection.  No discharge or erythema, or obvious scleral/conjunctival abnormalities.  RESP: No audible wheeze, cough, or visible cyanosis.    SKIN: Visible skin clear. No significant rash, abnormal pigmentation or lesions.  NEURO: Cranial nerves grossly intact.  Mentation and speech appropriate for age.  PSYCH: Appropriate affect, tone, and pace of words          Video-Visit Details    Type of service:  Video Visit   Originating Location (pt. Location): Home  Distant Location (provider location):  On-site  Platform used for Video Visit: Kate    Signed Electronically by: MAXX Fritz CNP

## 2025-04-09 DIAGNOSIS — M10.9 ACUTE GOUTY ARTHRITIS: ICD-10-CM

## 2025-04-09 RX ORDER — ALLOPURINOL 100 MG/1
100 TABLET ORAL DAILY
Qty: 90 TABLET | Refills: 3 | Status: SHIPPED | OUTPATIENT
Start: 2025-04-09

## 2025-04-16 ENCOUNTER — VIRTUAL VISIT (OUTPATIENT)
Dept: INTERNAL MEDICINE | Facility: CLINIC | Age: 35
End: 2025-04-16
Payer: COMMERCIAL

## 2025-04-16 DIAGNOSIS — M10.9 ACUTE GOUTY ARTHRITIS: Primary | ICD-10-CM

## 2025-04-16 DIAGNOSIS — F33.0 MILD EPISODE OF RECURRENT MAJOR DEPRESSIVE DISORDER: ICD-10-CM

## 2025-04-16 PROCEDURE — 98006 SYNCH AUDIO-VIDEO EST MOD 30: CPT | Performed by: INTERNAL MEDICINE

## 2025-04-16 RX ORDER — COLCHICINE 0.6 MG/1
TABLET ORAL
Qty: 20 TABLET | Refills: 0 | Status: SHIPPED | OUTPATIENT
Start: 2025-04-16

## 2025-04-16 RX ORDER — BUPROPION HYDROCHLORIDE 150 MG/1
150 TABLET ORAL EVERY MORNING
Qty: 90 TABLET | Refills: 1 | Status: SHIPPED | OUTPATIENT
Start: 2025-04-16

## 2025-04-16 NOTE — PROGRESS NOTES
"Fercho is a 35 year old who is being evaluated via a billable video visit.    How would you like to obtain your AVS? MyChart  If the video visit is dropped, the invitation should be resent by: Send to e-mail at: [email or web address]  Will anyone else be joining your video visit? No      Assessment & Plan     (M10.9) Acute gouty arthritis  (primary encounter diagnosis)  Comment: recurrent. First diagnosed in 2021 (left foot pain, uric acid elevated). Has had some intermittent \"attacks\" since then. Seen virtual a month ago, started on allopurinol though believes he has intermittent itching that started with the medication initiation. On 100mg currently. He has not had any additional \"attacks\" over the last month.  Plan: colchicine (COLCRYS) 0.6 MG tablet, Uric acid        Recommended he stop the allopurinol to see if the itching improves. If it does not, then he should get back on the medication as it appears to be working for now. He may get an updated uric acid level prior to stopping it. If the itching goes away, then consider Uloric or other preventative option. He will update us in 2-3 weeks.    (F33.0) Mild episode of recurrent major depressive disorder  Comment: stable though forgets the AM dose of his IR version (75mg).  Plan: buPROPion (WELLBUTRIN XL) 150 MG 24 hr tablet        He is willing to transition to 150mg XL daily.     Advised that he schedule an in-person visit to establish care with one provider in the near future to help with continuity of care.    The longitudinal plan of care for the diagnosis(es)/condition(s) as documented were addressed during this visit. Due to the added complexity in care, I will continue to support Fercho in the subsequent management and with ongoing continuity of care.           Subjective   Fercho is a 35 year old, presenting for the following health issues:  Recheck Medication (Discuss side effects of medication for gout. Would like to talk about switch medication maybe. He " feels like he may be having a reaction.)      4/16/2025     3:13 PM   Additional Questions   Roomed by Mckenzie SHIN     History of Present Illness       Reason for visit:  Gout medication follow up.    He eats 2-3 servings of fruits and vegetables daily.He consumes 1 sweetened beverage(s) daily.He exercises with enough effort to increase his heart rate 10 to 19 minutes per day.  He exercises with enough effort to increase his heart rate 3 or less days per week. He is missing 1 dose(s) of medications per week.  He is not taking prescribed medications regularly due to remembering to take.                    Objective    Vitals - Patient Reported  Systolic (Patient Reported):  (does not monitor)      Vitals:  No vitals were obtained today due to virtual visit.    Physical Exam   GENERAL: alert and no distress  EYES: Eyes grossly normal to inspection.  No discharge or erythema, or obvious scleral/conjunctival abnormalities.  RESP: No audible wheeze, cough, or visible cyanosis.    SKIN: Visible skin clear. No significant rash, abnormal pigmentation or lesions.  NEURO: Cranial nerves grossly intact.  Mentation and speech appropriate for age.  PSYCH: Appropriate affect, tone, and pace of words          Video-Visit Details    Type of service:  Video Visit   Originating Location (pt. Location): Home    Distant Location (provider location):  On-site  Platform used for Video Visit: Kate  Signed Electronically by: Byron Mark MD

## 2025-04-29 ENCOUNTER — MYC MEDICAL ADVICE (OUTPATIENT)
Dept: INTERNAL MEDICINE | Facility: CLINIC | Age: 35
End: 2025-04-29
Payer: COMMERCIAL

## 2025-04-29 DIAGNOSIS — M1A.00X0 IDIOPATHIC CHRONIC GOUT WITHOUT TOPHUS, UNSPECIFIED SITE: Primary | ICD-10-CM

## 2025-04-29 DIAGNOSIS — M10.9 ACUTE GOUTY ARTHRITIS: ICD-10-CM

## 2025-04-30 RX ORDER — FEBUXOSTAT 40 MG/1
40 TABLET, FILM COATED ORAL DAILY
Qty: 30 TABLET | Refills: 1 | Status: SHIPPED | OUTPATIENT
Start: 2025-04-30

## 2025-04-30 RX ORDER — COLCHICINE 0.6 MG/1
TABLET ORAL
Qty: 30 TABLET | Refills: 1 | Status: SHIPPED | OUTPATIENT
Start: 2025-04-30

## 2025-05-08 DIAGNOSIS — M10.9 ACUTE GOUTY ARTHRITIS: ICD-10-CM

## 2025-05-08 RX ORDER — COLCHICINE 0.6 MG/1
TABLET ORAL
Qty: 180 TABLET | Refills: 1 | OUTPATIENT
Start: 2025-05-08

## 2025-05-21 ENCOUNTER — VIRTUAL VISIT (OUTPATIENT)
Dept: INTERNAL MEDICINE | Facility: CLINIC | Age: 35
End: 2025-05-21
Payer: COMMERCIAL

## 2025-05-21 DIAGNOSIS — R03.0 ELEVATED BLOOD PRESSURE READING WITHOUT DIAGNOSIS OF HYPERTENSION: Primary | ICD-10-CM

## 2025-05-21 DIAGNOSIS — M10.9 ACUTE GOUTY ARTHRITIS: ICD-10-CM

## 2025-05-21 PROCEDURE — 98005 SYNCH AUDIO-VIDEO EST LOW 20: CPT | Performed by: INTERNAL MEDICINE

## 2025-05-21 RX ORDER — ALLOPURINOL 100 MG/1
50 TABLET ORAL DAILY
COMMUNITY
Start: 2025-05-21

## 2025-05-21 NOTE — PROGRESS NOTES
Fercho is a 35 year old who is being evaluated via a billable video visit.    How would you like to obtain your AVS? MyChart  If the video visit is dropped, the invitation should be resent by: Send to e-mail at: [email or web address]  Will anyone else be joining your video visit? No  {If patient encounters technical issues they should call [phone] :547034}    Assessment & Plan     (R03.0) Elevated blood pressure reading without diagnosis of hypertension  (primary encounter diagnosis)  Comment: ***  Plan: ***    (M10.9) Acute gouty arthritis  Comment: ***  Plan: allopurinol (ZYLOPRIM) 100 MG tablet        ***    The longitudinal plan of care for the diagnosis(es)/condition(s) as documented were addressed during this visit. Due to the added complexity in care, I will continue to support Fercho in the subsequent management and with ongoing continuity of care.         {Follow-up (Optional):688274}    Subjective   Fercho is a 35 year old, presenting for the following health issues:  Hypertension (Pt [employer] BP and it's been running 140's over 100's )      5/21/2025     3:36 PM   Additional Questions   Roomed by Roberta GOFF   Accompanied by alone         5/21/2025     3:36 PM   Patient Reported Additional Medications   Patient reports taking the following new medications none     History of Present Illness       Reason for visit:  Blood pressure concerns   He is taking medications regularly.        {SUPERLIST (Optional):897401}  {additonal problems for provider to add (Optional):261008}    {ROS Picklists (Optional):531654}      Objective           Vitals:  No vitals were obtained today due to virtual visit.    Physical Exam   GENERAL: alert and no distress  EYES: Eyes grossly normal to inspection.  No discharge or erythema, or obvious scleral/conjunctival abnormalities.  RESP: No audible wheeze, cough, or visible cyanosis.    SKIN: Visible skin clear. No significant rash, abnormal pigmentation or lesions.  NEURO: Cranial  nerves grossly intact.  Mentation and speech appropriate for age.  PSYCH: Appropriate affect, tone, and pace of words    {Diagnostic Test Results (Optional):876148}      Video-Visit Details    Type of service:  Video Visit   Originating Location (pt. Location): Home  {PROVIDER LOCATION On-site should be selected for visits conducted from your clinic location or adjoining [facility] hospital, academic office, or other nearby [facility] building. Off-site should be selected for all other provider locations, including home:814392}  Distant Location (provider location):  On-site  Platform used for Video Visit: Kate  Signed Electronically by: Byron Mark MD  {Email feedback regarding this note to [email or web address]   :682448}

## 2025-06-10 ENCOUNTER — OFFICE VISIT (OUTPATIENT)
Dept: INTERNAL MEDICINE | Facility: CLINIC | Age: 35
End: 2025-06-10
Payer: COMMERCIAL

## 2025-06-10 VITALS
WEIGHT: 143 LBS | BODY MASS INDEX: 25.34 KG/M2 | TEMPERATURE: 98.9 F | OXYGEN SATURATION: 98 % | SYSTOLIC BLOOD PRESSURE: 120 MMHG | HEART RATE: 92 BPM | RESPIRATION RATE: 15 BRPM | HEIGHT: 63 IN | DIASTOLIC BLOOD PRESSURE: 8 MMHG

## 2025-06-10 DIAGNOSIS — G43.109 MIGRAINE WITH AURA AND WITHOUT STATUS MIGRAINOSUS, NOT INTRACTABLE: ICD-10-CM

## 2025-06-10 DIAGNOSIS — M10.9 ACUTE GOUTY ARTHRITIS: ICD-10-CM

## 2025-06-10 DIAGNOSIS — Z11.4 SCREENING FOR HIV (HUMAN IMMUNODEFICIENCY VIRUS): Primary | ICD-10-CM

## 2025-06-10 DIAGNOSIS — M1A.0720 IDIOPATHIC CHRONIC GOUT OF LEFT ANKLE WITHOUT TOPHUS: ICD-10-CM

## 2025-06-10 DIAGNOSIS — F42.9 OBSESSIVE-COMPULSIVE DISORDER, UNSPECIFIED TYPE: ICD-10-CM

## 2025-06-10 DIAGNOSIS — E05.90 HYPERTHYROIDISM: ICD-10-CM

## 2025-06-10 DIAGNOSIS — R00.2 PALPITATIONS: ICD-10-CM

## 2025-06-10 DIAGNOSIS — Z13.220 SCREENING FOR HYPERLIPIDEMIA: ICD-10-CM

## 2025-06-10 DIAGNOSIS — Z11.59 NEED FOR HEPATITIS C SCREENING TEST: ICD-10-CM

## 2025-06-10 LAB
ATRIAL RATE - MUSE: 67 BPM
DIASTOLIC BLOOD PRESSURE - MUSE: NORMAL MMHG
ERYTHROCYTE [DISTWIDTH] IN BLOOD BY AUTOMATED COUNT: 11.6 % (ref 10–15)
HCT VFR BLD AUTO: 44.1 % (ref 40–53)
HGB BLD-MCNC: 14.9 G/DL (ref 13.3–17.7)
INTERPRETATION ECG - MUSE: NORMAL
MCH RBC QN AUTO: 29.6 PG (ref 26.5–33)
MCHC RBC AUTO-ENTMCNC: 33.8 G/DL (ref 31.5–36.5)
MCV RBC AUTO: 88 FL (ref 78–100)
P AXIS - MUSE: 41 DEGREES
PLATELET # BLD AUTO: 353 10E3/UL (ref 150–450)
PR INTERVAL - MUSE: 152 MS
QRS DURATION - MUSE: 92 MS
QT - MUSE: 430 MS
QTC - MUSE: 454 MS
R AXIS - MUSE: 46 DEGREES
RBC # BLD AUTO: 5.04 10E6/UL (ref 4.4–5.9)
SYSTOLIC BLOOD PRESSURE - MUSE: NORMAL MMHG
T AXIS - MUSE: 46 DEGREES
VENTRICULAR RATE- MUSE: 67 BPM
WBC # BLD AUTO: 6.3 10E3/UL (ref 4–11)

## 2025-06-10 PROCEDURE — 87389 HIV-1 AG W/HIV-1&-2 AB AG IA: CPT | Performed by: INTERNAL MEDICINE

## 2025-06-10 PROCEDURE — 83721 ASSAY OF BLOOD LIPOPROTEIN: CPT | Performed by: INTERNAL MEDICINE

## 2025-06-10 PROCEDURE — 85027 COMPLETE CBC AUTOMATED: CPT | Performed by: INTERNAL MEDICINE

## 2025-06-10 PROCEDURE — 80053 COMPREHEN METABOLIC PANEL: CPT | Performed by: INTERNAL MEDICINE

## 2025-06-10 PROCEDURE — 99214 OFFICE O/P EST MOD 30 MIN: CPT | Mod: 25 | Performed by: INTERNAL MEDICINE

## 2025-06-10 PROCEDURE — 84550 ASSAY OF BLOOD/URIC ACID: CPT | Performed by: INTERNAL MEDICINE

## 2025-06-10 PROCEDURE — 86803 HEPATITIS C AB TEST: CPT | Performed by: INTERNAL MEDICINE

## 2025-06-10 PROCEDURE — 93010 ELECTROCARDIOGRAM REPORT: CPT | Performed by: INTERNAL MEDICINE

## 2025-06-10 PROCEDURE — 3074F SYST BP LT 130 MM HG: CPT | Performed by: INTERNAL MEDICINE

## 2025-06-10 PROCEDURE — 1125F AMNT PAIN NOTED PAIN PRSNT: CPT | Performed by: INTERNAL MEDICINE

## 2025-06-10 PROCEDURE — G2211 COMPLEX E/M VISIT ADD ON: HCPCS | Performed by: INTERNAL MEDICINE

## 2025-06-10 PROCEDURE — 84443 ASSAY THYROID STIM HORMONE: CPT | Performed by: INTERNAL MEDICINE

## 2025-06-10 PROCEDURE — 99395 PREV VISIT EST AGE 18-39: CPT | Performed by: INTERNAL MEDICINE

## 2025-06-10 PROCEDURE — 3078F DIAST BP <80 MM HG: CPT | Performed by: INTERNAL MEDICINE

## 2025-06-10 PROCEDURE — 36415 COLL VENOUS BLD VENIPUNCTURE: CPT | Performed by: INTERNAL MEDICINE

## 2025-06-10 PROCEDURE — 93005 ELECTROCARDIOGRAM TRACING: CPT | Performed by: INTERNAL MEDICINE

## 2025-06-10 SDOH — HEALTH STABILITY: PHYSICAL HEALTH: ON AVERAGE, HOW MANY MINUTES DO YOU ENGAGE IN EXERCISE AT THIS LEVEL?: 30 MIN

## 2025-06-10 SDOH — HEALTH STABILITY: PHYSICAL HEALTH: ON AVERAGE, HOW MANY DAYS PER WEEK DO YOU ENGAGE IN MODERATE TO STRENUOUS EXERCISE (LIKE A BRISK WALK)?: 2 DAYS

## 2025-06-10 ASSESSMENT — PAIN SCALES - GENERAL: PAINLEVEL_OUTOF10: MILD PAIN (3)

## 2025-06-10 ASSESSMENT — SOCIAL DETERMINANTS OF HEALTH (SDOH): HOW OFTEN DO YOU GET TOGETHER WITH FRIENDS OR RELATIVES?: ONCE A WEEK

## 2025-06-10 NOTE — PROGRESS NOTES
"Preventive Care Visit  [facility]  Harlan Ibrahim MD, Internal Medicine  Sergei 10, 2025    Assessment & Plan     Screening for HIV (human immunodeficiency virus)  ordered    Need for hepatitis C screening test  ordered    Obsessive-compulsive disorder, unspecified type  Stable on treatment with bupropion and escitalopram.     Migraine with aura and without status migrainosus,  Has visual aura, not unusually severe, or progressing.       Idiopathic chronic gout of left ankle without tophus  [place] the allopurinol 100 mg daily, he has had some improvement, still gets left ankle gout, milder than before.  No reaction to taking allopurinol.  He will increase the allopurinol to 200 mg daily.  Can use ibuprofen for attacks if the short term increase on this dose.  Will get uric acid today to see how he responded to 100 mg for future evaluation.     Palpitations  4 short tachcardia episodes.  No associated symptoms.  Otherwise feels well, and tolerates physical activity well.  His EKG today is essentially normal today.  We discussed caffeine role, and will test for thyroid disease.  I offered echocardiogram. He declined for now but will seek further care if episodes become more severe, or progress.      [place]  Both feet, not symptomatic. Or have lesions or callous.      BMI  Estimated body mass index is 25.34 kg/m  as calculated from the following:    Height as of this encounter: 1.6 m (5' 2.99\").    Weight as of this encounter: 64.9 kg (143 lb).     Counseling  Appropriate preventive services were addressed with this patient via screening, questionnaire, or discussion as appropriate for fall prevention, nutrition, physical activity, Tobacco-use cessation, social engagement, weight loss and cognition.  Checklist reviewing preventive services available has been given to the patient.  Reviewed patient's diet, addressing concerns and/or questions.   He is at risk for lack of exercise and has been provided " with information to increase physical activity for the benefit of his well-being.     Follow up yearly  The longitudinal plan of care for the diagnosis(es)/condition(s) as documented were addressed during this visit. Due to the added complexity in care, I will continue to support Fercho in the subsequent management and with ongoing continuity of care.    Michael Brantley is a 35 year old, presenting for the following:  Annual Visit (He would like to discuss gout, migraines, right side pain. He still needs to to his lab/uric acid.)        6/10/2025     2:56 PM   Additional Questions   Roomed by Mckenzie BERGERON  He has been OK.  Has had continued mild left ankle gout, not as severe. He is tolerating the allopurinol 100 mg daily Colchicine doesn't help much.  He also has had 4 episodes of brief - less than one minute - palpitations, which he indicates are rapid regular 'Runs'.  Mildly dyspneic with them, no dizziness or visual or focal arm or leg symptoms.  He is otherwise well, working and sleeping OK, no bowel or bladder issues.     Advance Care Planning  Discussed advance care planning with patient; however, patient declined at this time.        6/10/2025   General Health   How would you rate your overall physical health? Good   Feel stress (tense, anxious, or unable to sleep) Only a little   (!) STRESS CONCERN      6/10/2025   Nutrition   Three or more servings of calcium each day? (!) NO   Diet: I don't know   How many servings of fruit and vegetables per day? (!) 0-1   How many sweetened beverages each day? 0-1         6/10/2025   Exercise   Days per week of moderate/strenous exercise 2 days   Average minutes spent exercising at this level 30 min   (!) EXERCISE CONCERN      6/10/2025   Social Factors   Frequency of gathering with friends or relatives Once a week   Worry food won't last until get money to buy more No   Food not last or not have enough money for food? No   Do you have housing? (Housing is  "defined as stable permanent housing and does not include staying outside in a car, in a tent, in an abandoned building, in an overnight shelter, or couch-surfing.) No   Are you worried about losing your housing? No   Lack of transportation? No   Unable to get utilities (heat,electricity)? No   Want help with housing or utility concern? No   (!) HOUSING CONCERN PRESENT      6/10/2025   Dental   Dentist two times every year? Yes         6/10/2025   Substance Use   Alcohol more than 3/day or more than 7/wk No   Do you use any other substances recreationally? (!) CANNABIS PRODUCTS     Social History     Tobacco Use    Smoking status: Never    Smokeless tobacco: Never   Vaping Use    Vaping status: Never Used   Substance Use Topics    Alcohol use: Yes    Drug use: No         6/10/2025   One time HIV Screening   Previous HIV test? No         6/10/2025   STI Screening   New sexual partner(s) since last STI/HIV test? No         6/10/2025   Contraception/Family Planning   Questions about contraception or family planning No     Reviewed and updated as needed this visit by Provider    Past Medical History:   Diagnosis Date    Anxiety and depression      Past Surgical History:   Procedure Laterality Date    NO PAST SURGERIES       Review of Systems  See HPI    Objective      PHYSICAL EXAM:  General Appearance: In no acute distress  Vitals:    06/10/25 1501   BP: (!) 120/8   BP Location: Left arm   Patient Position: Sitting   Cuff Size: Adult Large   Pulse: 92   Resp: 15   Temp: 98.9  F (37.2  C)   TempSrc: Temporal   SpO2: 98%   Weight: 64.9 kg (143 lb)   Height: 1.6 m (5' 2.99\")     Estimated body mass index is 25.34 kg/m  as calculated from the following:    Height as of this encounter: 1.6 m (5' 2.99\").    Weight as of this encounter: 64.9 kg (143 lb).  EYES: Clear, fundi are unremarkable   HEENT: nose and throat clear, ears normal   NECK:  without cervical or axillary adenopathy  RESPIRATORY: Clear   CARDIOVASCULAR: S1, " S2  ABDOMEN: soft, flat, and non-tender  EXTREMITIES:  no significant inflammation or edema  NEUROLOGIC: Speech is clear,  gait is normal  PSYCHIATRIC: Oriented X 3, thinking is clear     Signed Electronically by: Harlan Ibrahim MD

## 2025-06-11 ENCOUNTER — RESULTS FOLLOW-UP (OUTPATIENT)
Dept: INTERNAL MEDICINE | Facility: CLINIC | Age: 35
End: 2025-06-11
Payer: COMMERCIAL

## 2025-06-11 LAB
ALBUMIN SERPL BCG-MCNC: 4.5 G/DL (ref 3.5–5.2)
ALP SERPL-CCNC: 68 U/L (ref 40–150)
ALT SERPL W P-5'-P-CCNC: 80 U/L (ref 0–70)
ANION GAP SERPL CALCULATED.3IONS-SCNC: 10 MMOL/L (ref 7–15)
AST SERPL W P-5'-P-CCNC: 140 U/L (ref 0–45)
BILIRUB SERPL-MCNC: 0.5 MG/DL
BUN SERPL-MCNC: 10.9 MG/DL (ref 6–20)
CALCIUM SERPL-MCNC: 10.1 MG/DL (ref 8.8–10.4)
CHLORIDE SERPL-SCNC: 99 MMOL/L (ref 98–107)
CREAT SERPL-MCNC: 1.28 MG/DL (ref 0.67–1.17)
EGFRCR SERPLBLD CKD-EPI 2021: 76 ML/MIN/1.73M2
GLUCOSE SERPL-MCNC: 78 MG/DL (ref 70–99)
HCO3 SERPL-SCNC: 28 MMOL/L (ref 22–29)
HCV AB SERPL QL IA: NONREACTIVE
HIV 1+2 AB+HIV1 P24 AG SERPL QL IA: NONREACTIVE
LDLC SERPL DIRECT ASSAY-MCNC: 123 MG/DL
POTASSIUM SERPL-SCNC: 3.9 MMOL/L (ref 3.4–5.3)
PROT SERPL-MCNC: 8.2 G/DL (ref 6.4–8.3)
SODIUM SERPL-SCNC: 137 MMOL/L (ref 135–145)
TSH SERPL DL<=0.005 MIU/L-ACNC: 1.65 UIU/ML (ref 0.3–4.2)
URATE SERPL-MCNC: 7.5 MG/DL (ref 3.4–7)

## 2025-07-18 ENCOUNTER — MYC REFILL (OUTPATIENT)
Dept: INTERNAL MEDICINE | Facility: CLINIC | Age: 35
End: 2025-07-18
Payer: COMMERCIAL

## 2025-07-18 DIAGNOSIS — F41.1 GENERALIZED ANXIETY DISORDER: ICD-10-CM

## 2025-07-18 DIAGNOSIS — F33.0 MILD EPISODE OF RECURRENT MAJOR DEPRESSIVE DISORDER: ICD-10-CM

## 2025-07-18 DIAGNOSIS — M10.9 ACUTE GOUTY ARTHRITIS: ICD-10-CM

## 2025-07-20 RX ORDER — ESCITALOPRAM OXALATE 20 MG/1
20 TABLET ORAL DAILY
Qty: 90 TABLET | Refills: 3 | Status: SHIPPED | OUTPATIENT
Start: 2025-07-20

## 2025-07-21 RX ORDER — BUPROPION HYDROCHLORIDE 150 MG/1
150 TABLET ORAL EVERY MORNING
Qty: 90 TABLET | Refills: 1 | OUTPATIENT
Start: 2025-07-21

## 2025-07-21 RX ORDER — ESCITALOPRAM OXALATE 20 MG/1
20 TABLET ORAL DAILY
Qty: 90 TABLET | Refills: 3 | OUTPATIENT
Start: 2025-07-21

## 2025-07-21 RX ORDER — ALLOPURINOL 100 MG/1
100 TABLET ORAL DAILY
Qty: 90 TABLET | Refills: 3 | Status: SHIPPED | OUTPATIENT
Start: 2025-07-21
